# Patient Record
Sex: FEMALE | Race: WHITE | Employment: OTHER | URBAN - METROPOLITAN AREA
[De-identification: names, ages, dates, MRNs, and addresses within clinical notes are randomized per-mention and may not be internally consistent; named-entity substitution may affect disease eponyms.]

---

## 2017-05-03 ENCOUNTER — HOSPITAL ENCOUNTER (OUTPATIENT)
Dept: WOMENS IMAGING | Age: 62
Discharge: HOME | End: 2017-05-03
Attending: FAMILY MEDICINE | Admitting: PHYSICIAN ASSISTANT

## 2017-05-03 DIAGNOSIS — Z12.31 SCREENING MAMMOGRAM, ENCOUNTER FOR: ICD-10-CM

## 2017-05-03 DIAGNOSIS — M85.80 OSTEOPENIA: ICD-10-CM

## 2017-12-04 NOTE — ANESTHESIA PRE-OP
medications for this encounter. Allergies: Allergies   Allergen Reactions    Penicillins        Problem List:    Patient Active Problem List   Diagnosis Code    Incisional hernia K43.2    Fat necrosis M79.89       Past Medical History:        Diagnosis Date    Arthritis     Cancer ([facility] [address] 2016    endometrial    Hypertension        Past Surgical History:        Procedure Laterality Date     SECTION      COLONOSCOPY      exams x2 - HX: polyps, unsure of dates    HERNIA REPAIR  1110    umbillical    HERNIA REPAIR  2323    umbillical    HYSTERECTOMY  10/03/2016    d/t endometrial CA    TUNNELED VENOUS PORT PLACEMENT Right 10/2016       Social History:    Social History   Substance Use Topics    Smoking status: Never Smoker    Smokeless tobacco: Never Used    Alcohol use No                                Counseling given: Not Answered      Vital Signs (Current): There were no vitals filed for this visit. BP Readings from Last 3 Encounters:   14 130/80   10/15/14 130/72   10/06/14 122/78       NPO Status:                                                                                 BMI:   Wt Readings from Last 3 Encounters:   17 225 lb (102.1 kg)   14 240 lb (108.9 kg)   10/15/14 241 lb (109.3 kg)     There is no height or weight on file to calculate BMI. Anesthesia Evaluation   Patient summary reviewed no history of anesthetic complications:   Airway:         Dental:          Pulmonary:                              Cardiovascular:    (+)  hypertension:,                   Neuro/Psych:               GI/Hepatic/Renal:             Endo/Other:    (+)  malignancy/cancer. Abdominal:           Vascular:                                        Anesthesia Plan      general, MAC and TIVA     ASA 3      (Chart review only.)  Induction:  intravenous.                           Vipin Fay CRNA 12/4/2017    =================  Addendum =================  Patient records were reviewed and the patient seen and evaluated. Most Recent Results:  BP (!) 138/46   Pulse 88   Temp 97 °F (36.1 °C) (Temporal)   Resp 18   Ht 5' 2\" (1.575 m)   Wt 224 lb (101.6 kg)   SpO2 99%   BMI 40.97 kg/m²      Lab Results   Component Value Date/Time     10/15/2010 08:46 AM    K 4.0 10/15/2010 08:46 AM     10/15/2010 08:46 AM    CO2 29 10/15/2010 08:46 AM    BUN 12 10/15/2010 08:46 AM    CREATININE 0.6 10/15/2010 08:46 AM     Anesthesiology focused physical examination:  MP2, irregular  RRR without murmur  Lungs clear    Assessment/Plan:  NPO status confirmed  beta-blockers - took her Ziac @ 0510  Plan MAC/TIVA    PS 3    Anesthesia procedures discussed - all questions answered.     Jeremiah Sheikh MD

## 2017-12-05 ENCOUNTER — HOSPITAL ENCOUNTER (OUTPATIENT)
Dept: SURGERY | Age: 62
Discharge: HOME | End: 2017-12-05
Attending: INTERNAL MEDICINE | Admitting: INTERNAL MEDICINE

## 2017-12-05 VITALS
HEIGHT: 62 IN | HEART RATE: 71 BPM | BODY MASS INDEX: 41.22 KG/M2 | WEIGHT: 224 LBS | DIASTOLIC BLOOD PRESSURE: 57 MMHG | RESPIRATION RATE: 16 BRPM | TEMPERATURE: 97.5 F | SYSTOLIC BLOOD PRESSURE: 133 MMHG | OXYGEN SATURATION: 98 %

## 2017-12-05 RX ORDER — SODIUM CHLORIDE, SODIUM LACTATE, POTASSIUM CHLORIDE, CALCIUM CHLORIDE 600; 310; 30; 20 MG/100ML; MG/100ML; MG/100ML; MG/100ML
INJECTION, SOLUTION INTRAVENOUS CONTINUOUS
Status: DISCONTINUED | OUTPATIENT
Start: 2017-12-05 | End: 2017-12-06 | Stop reason: HOSPADM

## 2017-12-05 RX ORDER — MULTIVITAMIN WITH IRON
100 TABLET ORAL DAILY
COMMUNITY

## 2017-12-05 RX ADMIN — SODIUM CHLORIDE, SODIUM LACTATE, POTASSIUM CHLORIDE, CALCIUM CHLORIDE: 600; 310; 30; 20 INJECTION, SOLUTION INTRAVENOUS at 08:05

## 2017-12-05 ASSESSMENT — PAIN DESCRIPTION - ORIENTATION: ORIENTATION: LOWER

## 2017-12-05 ASSESSMENT — PAIN DESCRIPTION - LOCATION: LOCATION: ABDOMEN

## 2017-12-05 ASSESSMENT — PAIN DESCRIPTION - DESCRIPTORS: DESCRIPTORS: CRAMPING

## 2017-12-05 ASSESSMENT — PAIN - FUNCTIONAL ASSESSMENT: PAIN_FUNCTIONAL_ASSESSMENT: 0-10

## 2017-12-05 ASSESSMENT — PAIN SCALES - GENERAL
PAINLEVEL_OUTOF10: 2
PAINLEVEL_OUTOF10: 0

## 2017-12-05 ASSESSMENT — PAIN DESCRIPTION - PAIN TYPE: TYPE: ACUTE PAIN

## 2017-12-05 NOTE — ANESTHESIA POST-OP
Anesthesia Post-op Note    Patient:    Nicko Garcia  MRN:     [id number]   YOB: 1955    Anesthesia Post Evaluation    Final anesthesia type:  MAC-TIVA  Location of evaluation:  OR  Patient participation:   complete - patient participated  Level of consciousness:  awake  Pain score:    0  Airway patency:   patent  Nausea & Vomiting:   no nausea and no vomiting  Complications:  no  Cardiovascular status:  blood pressure returned to baseline  Respiratory status:   acceptable  Hydration status:   euvolemic    Ash Gorman MD

## 2018-08-06 ENCOUNTER — HOSPITAL ENCOUNTER (OUTPATIENT)
Dept: WOMENS IMAGING | Age: 63
Discharge: HOME | End: 2018-08-06
Attending: OBSTETRICS & GYNECOLOGY | Admitting: OBSTETRICS & GYNECOLOGY

## 2018-08-06 DIAGNOSIS — Z12.31 VISIT FOR SCREENING MAMMOGRAM: ICD-10-CM

## 2018-09-14 LAB
ALBUMIN SERPL-MCNC: NORMAL G/DL
ALBUMIN SERPL-MCNC: NORMAL G/DL
ALP BLD-CCNC: NORMAL U/L
ALP BLD-CCNC: NORMAL U/L
ALT SERPL-CCNC: NORMAL U/L
ALT SERPL-CCNC: NORMAL U/L
ANION GAP SERPL CALCULATED.3IONS-SCNC: NORMAL MMOL/L
ANION GAP SERPL CALCULATED.3IONS-SCNC: NORMAL MMOL/L
AST SERPL-CCNC: NORMAL U/L
AST SERPL-CCNC: NORMAL U/L
BILIRUB SERPL-MCNC: NORMAL MG/DL (ref 0.1–1.4)
BILIRUB SERPL-MCNC: NORMAL MG/DL (ref 0.1–1.4)
BUN BLDV-MCNC: NORMAL MG/DL
BUN BLDV-MCNC: NORMAL MG/DL
CALCIUM SERPL-MCNC: NORMAL MG/DL
CALCIUM SERPL-MCNC: NORMAL MG/DL
CHLORIDE BLD-SCNC: NORMAL MMOL/L
CHLORIDE BLD-SCNC: NORMAL MMOL/L
CHOLESTEROL, TOTAL: 197 MG/DL
CHOLESTEROL/HDL RATIO: ABNORMAL
CO2: NORMAL MMOL/L
CO2: NORMAL MMOL/L
CREAT SERPL-MCNC: 0.6 MG/DL
CREAT SERPL-MCNC: 0.6 MG/DL
GFR CALCULATED: NORMAL
GFR CALCULATED: NORMAL
GLUCOSE BLD-MCNC: NORMAL MG/DL
GLUCOSE BLD-MCNC: NORMAL MG/DL
HDLC SERPL-MCNC: 40 MG/DL (ref 35–70)
LDL CHOLESTEROL CALCULATED: 108 MG/DL (ref 0–160)
POTASSIUM SERPL-SCNC: 3.9 MMOL/L
POTASSIUM SERPL-SCNC: 3.9 MMOL/L
SODIUM BLD-SCNC: NORMAL MMOL/L
SODIUM BLD-SCNC: NORMAL MMOL/L
TOTAL PROTEIN: NORMAL
TOTAL PROTEIN: NORMAL
TRIGL SERPL-MCNC: 247 MG/DL
VLDLC SERPL CALC-MCNC: 49 MG/DL

## 2019-06-28 DIAGNOSIS — I10 ESSENTIAL HYPERTENSION: ICD-10-CM

## 2019-06-28 DIAGNOSIS — K76.0 STEATOSIS OF LIVER: ICD-10-CM

## 2019-06-28 DIAGNOSIS — E78.5 HYPERLIPIDEMIA, UNSPECIFIED HYPERLIPIDEMIA TYPE: ICD-10-CM

## 2019-06-28 DIAGNOSIS — Z85.42 HISTORY OF MALIGNANT NEOPLASM OF UTERINE BODY: ICD-10-CM

## 2019-06-28 PROBLEM — K21.9 GERD (GASTROESOPHAGEAL REFLUX DISEASE): Status: ACTIVE | Noted: 2019-06-28

## 2019-06-28 PROBLEM — M17.9 OSTEOARTHRITIS OF KNEE: Status: ACTIVE | Noted: 2019-06-28

## 2019-06-28 RX ORDER — ACETAMINOPHEN 160 MG
TABLET,DISINTEGRATING ORAL DAILY
COMMUNITY

## 2019-06-28 RX ORDER — CLOBETASOL PROPIONATE 0.5 MG/G
OINTMENT TOPICAL 2 TIMES DAILY PRN
COMMUNITY
Start: 2016-11-15 | End: 2021-09-17

## 2019-06-28 RX ORDER — LIDOCAINE AND PRILOCAINE 25; 25 MG/G; MG/G
CREAM TOPICAL
COMMUNITY
Start: 2017-10-12 | End: 2020-01-16

## 2019-06-28 RX ORDER — TRIAMCINOLONE ACETONIDE 5 MG/G
CREAM TOPICAL 2 TIMES DAILY
COMMUNITY
End: 2020-01-16 | Stop reason: SDUPTHER

## 2019-07-11 ENCOUNTER — OFFICE VISIT (OUTPATIENT)
Dept: FAMILY MEDICINE CLINIC | Age: 64
End: 2019-07-11
Payer: COMMERCIAL

## 2019-07-11 VITALS
HEART RATE: 64 BPM | HEIGHT: 58 IN | BODY MASS INDEX: 47.23 KG/M2 | DIASTOLIC BLOOD PRESSURE: 74 MMHG | SYSTOLIC BLOOD PRESSURE: 110 MMHG | WEIGHT: 225 LBS

## 2019-07-11 DIAGNOSIS — M17.9 OSTEOARTHRITIS OF KNEE, UNSPECIFIED LATERALITY, UNSPECIFIED OSTEOARTHRITIS TYPE: ICD-10-CM

## 2019-07-11 DIAGNOSIS — I10 ESSENTIAL HYPERTENSION: Primary | ICD-10-CM

## 2019-07-11 DIAGNOSIS — C54.1 ENDOMETRIAL CANCER (HCC): ICD-10-CM

## 2019-07-11 DIAGNOSIS — R73.9 HYPERGLYCEMIA: ICD-10-CM

## 2019-07-11 DIAGNOSIS — K21.9 GASTROESOPHAGEAL REFLUX DISEASE WITHOUT ESOPHAGITIS: ICD-10-CM

## 2019-07-11 PROCEDURE — 99214 OFFICE O/P EST MOD 30 MIN: CPT | Performed by: FAMILY MEDICINE

## 2019-07-11 RX ORDER — FELODIPINE 2.5 MG/1
2.5 TABLET, EXTENDED RELEASE ORAL DAILY
Qty: 30 TABLET | Refills: 5 | Status: SHIPPED | OUTPATIENT
Start: 2019-07-11 | End: 2020-01-16 | Stop reason: SDUPTHER

## 2019-07-11 RX ORDER — FAMOTIDINE 20 MG/1
20 TABLET, FILM COATED ORAL 2 TIMES DAILY
Qty: 60 TABLET | Refills: 5 | Status: SHIPPED | OUTPATIENT
Start: 2019-07-11 | End: 2020-02-17 | Stop reason: SDUPTHER

## 2019-07-11 RX ORDER — FELODIPINE 2.5 MG/1
10 TABLET, EXTENDED RELEASE ORAL DAILY
Qty: 30 TABLET | Refills: 5 | Status: CANCELLED | OUTPATIENT
Start: 2019-07-11 | End: 2019-08-10

## 2019-07-11 RX ORDER — BISOPROLOL FUMARATE AND HYDROCHLOROTHIAZIDE 10; 6.25 MG/1; MG/1
1 TABLET ORAL DAILY
Qty: 30 TABLET | Refills: 5 | Status: SHIPPED | OUTPATIENT
Start: 2019-07-11 | End: 2020-01-16 | Stop reason: SDUPTHER

## 2019-07-11 ASSESSMENT — ENCOUNTER SYMPTOMS
CHEST TIGHTNESS: 0
RESPIRATORY NEGATIVE: 1
ABDOMINAL PAIN: 0
SINUS PRESSURE: 0
SORE THROAT: 0
SHORTNESS OF BREATH: 0
COUGH: 0
RHINORRHEA: 0
EYES NEGATIVE: 1
CONSTIPATION: 0
DIARRHEA: 0
ALLERGIC/IMMUNOLOGIC NEGATIVE: 1

## 2019-07-11 ASSESSMENT — PATIENT HEALTH QUESTIONNAIRE - PHQ9
1. LITTLE INTEREST OR PLEASURE IN DOING THINGS: 0
SUM OF ALL RESPONSES TO PHQ9 QUESTIONS 1 & 2: 0
SUM OF ALL RESPONSES TO PHQ QUESTIONS 1-9: 0
2. FEELING DOWN, DEPRESSED OR HOPELESS: 0
SUM OF ALL RESPONSES TO PHQ QUESTIONS 1-9: 0

## 2019-08-27 ENCOUNTER — HOSPITAL ENCOUNTER (OUTPATIENT)
Dept: WOMENS IMAGING | Age: 64
Discharge: HOME | End: 2019-08-27
Payer: COMMERCIAL

## 2019-08-27 DIAGNOSIS — Z12.31 SCREENING MAMMOGRAM, ENCOUNTER FOR: ICD-10-CM

## 2019-08-27 PROCEDURE — 77067 SCR MAMMO BI INCL CAD: CPT

## 2020-01-16 ENCOUNTER — OFFICE VISIT (OUTPATIENT)
Dept: FAMILY MEDICINE CLINIC | Age: 65
End: 2020-01-16
Payer: COMMERCIAL

## 2020-01-16 VITALS
HEART RATE: 64 BPM | BODY MASS INDEX: 33.65 KG/M2 | HEIGHT: 68 IN | DIASTOLIC BLOOD PRESSURE: 82 MMHG | WEIGHT: 222 LBS | SYSTOLIC BLOOD PRESSURE: 122 MMHG

## 2020-01-16 DIAGNOSIS — E78.2 MIXED HYPERLIPIDEMIA: ICD-10-CM

## 2020-01-16 DIAGNOSIS — I10 ESSENTIAL HYPERTENSION: ICD-10-CM

## 2020-01-16 DIAGNOSIS — R73.9 HYPERGLYCEMIA: ICD-10-CM

## 2020-01-16 LAB
A/G RATIO: 1.8 (ref 1.1–2.2)
ALBUMIN SERPL-MCNC: 4.6 G/DL (ref 3.4–5)
ALP BLD-CCNC: 62 U/L (ref 40–129)
ALT SERPL-CCNC: 39 U/L (ref 10–40)
ANION GAP SERPL CALCULATED.3IONS-SCNC: 17 MMOL/L (ref 3–16)
AST SERPL-CCNC: 32 U/L (ref 15–37)
BASOPHILS ABSOLUTE: 0.1 K/UL (ref 0–0.2)
BASOPHILS RELATIVE PERCENT: 0.8 %
BILIRUB SERPL-MCNC: 0.4 MG/DL (ref 0–1)
BUN BLDV-MCNC: 14 MG/DL (ref 7–20)
CALCIUM SERPL-MCNC: 9.7 MG/DL (ref 8.3–10.6)
CHLORIDE BLD-SCNC: 100 MMOL/L (ref 99–110)
CHOLESTEROL, TOTAL: 177 MG/DL (ref 0–199)
CO2: 25 MMOL/L (ref 21–32)
CREAT SERPL-MCNC: 0.6 MG/DL (ref 0.6–1.2)
EOSINOPHILS ABSOLUTE: 0.1 K/UL (ref 0–0.6)
EOSINOPHILS RELATIVE PERCENT: 2.2 %
GFR AFRICAN AMERICAN: >60
GFR NON-AFRICAN AMERICAN: >60
GLOBULIN: 2.5 G/DL
GLUCOSE BLD-MCNC: 102 MG/DL (ref 70–99)
HCT VFR BLD CALC: 40.4 % (ref 36–48)
HDLC SERPL-MCNC: 42 MG/DL (ref 40–60)
HEMOGLOBIN: 14 G/DL (ref 12–16)
LDL CHOLESTEROL CALCULATED: 81 MG/DL
LYMPHOCYTES ABSOLUTE: 0.9 K/UL (ref 1–5.1)
LYMPHOCYTES RELATIVE PERCENT: 14.9 %
MCH RBC QN AUTO: 31.5 PG (ref 26–34)
MCHC RBC AUTO-ENTMCNC: 34.6 G/DL (ref 31–36)
MCV RBC AUTO: 91.1 FL (ref 80–100)
MONOCYTES ABSOLUTE: 0.6 K/UL (ref 0–1.3)
MONOCYTES RELATIVE PERCENT: 9.2 %
NEUTROPHILS ABSOLUTE: 4.4 K/UL (ref 1.7–7.7)
NEUTROPHILS RELATIVE PERCENT: 72.9 %
PDW BLD-RTO: 15.2 % (ref 12.4–15.4)
PLATELET # BLD: 204 K/UL (ref 135–450)
PMV BLD AUTO: 8.1 FL (ref 5–10.5)
POTASSIUM SERPL-SCNC: 3.8 MMOL/L (ref 3.5–5.1)
RBC # BLD: 4.43 M/UL (ref 4–5.2)
SODIUM BLD-SCNC: 142 MMOL/L (ref 136–145)
TOTAL PROTEIN: 7.1 G/DL (ref 6.4–8.2)
TRIGL SERPL-MCNC: 272 MG/DL (ref 0–150)
VLDLC SERPL CALC-MCNC: 54 MG/DL
WBC # BLD: 6 K/UL (ref 4–11)

## 2020-01-16 PROCEDURE — 99214 OFFICE O/P EST MOD 30 MIN: CPT | Performed by: FAMILY MEDICINE

## 2020-01-16 PROCEDURE — 90686 IIV4 VACC NO PRSV 0.5 ML IM: CPT | Performed by: FAMILY MEDICINE

## 2020-01-16 PROCEDURE — 90471 IMMUNIZATION ADMIN: CPT | Performed by: FAMILY MEDICINE

## 2020-01-16 RX ORDER — FELODIPINE 2.5 MG/1
2.5 TABLET, EXTENDED RELEASE ORAL DAILY
Qty: 30 TABLET | Refills: 5 | Status: SHIPPED | OUTPATIENT
Start: 2020-01-16 | End: 2020-08-05 | Stop reason: SDUPTHER

## 2020-01-16 RX ORDER — BISOPROLOL FUMARATE AND HYDROCHLOROTHIAZIDE 10; 6.25 MG/1; MG/1
1 TABLET ORAL DAILY
Qty: 30 TABLET | Refills: 5 | Status: SHIPPED | OUTPATIENT
Start: 2020-01-16 | End: 2020-08-05 | Stop reason: SDUPTHER

## 2020-01-16 RX ORDER — TRIAMCINOLONE ACETONIDE 5 MG/G
CREAM TOPICAL 2 TIMES DAILY
Qty: 30 G | Refills: 5 | Status: SHIPPED | OUTPATIENT
Start: 2020-01-16 | End: 2020-02-15

## 2020-01-16 ASSESSMENT — ENCOUNTER SYMPTOMS
CONSTIPATION: 0
RHINORRHEA: 0
SINUS PRESSURE: 0
DIARRHEA: 0
SORE THROAT: 0
ABDOMINAL PAIN: 0
CHEST TIGHTNESS: 0
SHORTNESS OF BREATH: 0
COUGH: 0

## 2020-01-16 ASSESSMENT — PATIENT HEALTH QUESTIONNAIRE - PHQ9
SUM OF ALL RESPONSES TO PHQ QUESTIONS 1-9: 0
1. LITTLE INTEREST OR PLEASURE IN DOING THINGS: 0
SUM OF ALL RESPONSES TO PHQ9 QUESTIONS 1 & 2: 0
2. FEELING DOWN, DEPRESSED OR HOPELESS: 0
SUM OF ALL RESPONSES TO PHQ QUESTIONS 1-9: 0

## 2020-01-17 LAB
ESTIMATED AVERAGE GLUCOSE: 91.1 MG/DL
HBA1C MFR BLD: 4.8 %

## 2020-01-17 NOTE — PROGRESS NOTES
1/16/2020    Radha Matthews    Chief Complaint   Patient presents with    6 Month Follow-Up    Other     no c/o       HPI  Haider Pritchett is a 59 y.o. female who presents today for follow up:    Patient is in excellent spirits. She seems to be doing very well status post her treatment for endometrial cancer. She continues to follow-up with them. She denies pain. Patient feels her reflux is controlled. She was to remain on the same medication. There is no side effects of her medication. Patient denies orthostatic symptoms. There is no side effects to her blood pressure medication. She wishes to remain on the same. Patient had no labs to review in 2019 from us. Patient is willing to do labs when fasting. REVIEW OF SYMPTOMS  Review of Systems   Constitutional: Negative for chills and fever. HENT: Negative for rhinorrhea, sinus pressure and sore throat. Respiratory: Negative for cough, chest tightness and shortness of breath. Gastrointestinal: Negative for abdominal pain, constipation and diarrhea. Genitourinary: Negative for dysuria and frequency. Musculoskeletal: Negative for myalgias. PAST MEDICAL HISTORY  Past Medical History:   Diagnosis Date    Essential hypertension 6/28/2019    GERD (gastroesophageal reflux disease) 6/28/2019    History of malignant neoplasm of uterine body 6/28/2019    Hyperlipidemia 6/28/2019    Osteoarthritis of knee 6/28/2019    Steatosis of liver 6/28/2019       FAMILY HISTORY  No family history on file.     SOCIAL HISTORY  Social History     Socioeconomic History    Marital status:      Spouse name: Not on file    Number of children: Not on file    Years of education: Not on file    Highest education level: Not on file   Occupational History    Not on file   Social Needs    Financial resource strain: Not on file    Food insecurity:     Worry: Not on file     Inability: Not on file    Transportation needs:     Medical: Not on file Non-medical: Not on file   Tobacco Use    Smoking status: Never Smoker    Smokeless tobacco: Never Used   Substance and Sexual Activity    Alcohol use: No    Drug use: No    Sexual activity: Not on file   Lifestyle    Physical activity:     Days per week: Not on file     Minutes per session: Not on file    Stress: Not on file   Relationships    Social connections:     Talks on phone: Not on file     Gets together: Not on file     Attends Restorationism service: Not on file     Active member of club or organization: Not on file     Attends meetings of clubs or organizations: Not on file     Relationship status: Not on file    Intimate partner violence:     Fear of current or ex partner: Not on file     Emotionally abused: Not on file     Physically abused: Not on file     Forced sexual activity: Not on file   Other Topics Concern    Not on file   Social History Narrative    Not on file        SURGICAL HISTORY  Past Surgical History:   Procedure Laterality Date     SECTION      COLONOSCOPY      exams x2 - HX: polyps, unsure of dates (endometrial CA)    COLONOSCOPY  2017    Normal exam    HERNIA REPAIR  3090    umbillical    HERNIA REPAIR  4046    umbillical    HYSTERECTOMY  10/03/2016    d/t endometrial CA    TUNNELED VENOUS PORT PLACEMENT Right 10/2016       CURRENT MEDICATIONS  Current Outpatient Medications   Medication Sig Dispense Refill    bisoprolol-hydrochlorothiazide (ZIAC) 10-6.25 MG per tablet Take 1 tablet by mouth daily 30 tablet 5    felodipine (PLENDIL) 2.5 MG extended release tablet Take 1 tablet by mouth daily 30 tablet 5    triamcinolone (ARISTOCORT) 0.5 % cream Apply topically 2 times daily Indications: apply to neck lesions twice daily Apply topically 3 times daily.  30 g 5    famotidine (PEPCID) 20 MG tablet Take 1 tablet by mouth 2 times daily 60 tablet 5    clobetasol (TEMOVATE) 0.05 % ointment Apply topically 2 times daily as needed Apply to rash under breast and on feet twice daily prn      Cholecalciferol (VITAMIN D3) 2000 units CAPS Take by mouth daily      vitamin B-6 (PYRIDOXINE) 100 MG tablet Take 100 mg by mouth daily      multivitamin-iron-minerals-folic acid (CENTRUM) chewable tablet Take 1 tablet by mouth daily. No current facility-administered medications for this visit. ALLERGIES  Allergies   Allergen Reactions    Erythromycin Other (See Comments)     GI    Lipitor [Atorvastatin Calcium]     Penicillins     Pravachol [Pravastatin Sodium]     Zocor [Simvastatin]     Doxycycline Rash       PHYSICAL EXAM  /82   Pulse 64   Ht 5' 8\" (1.727 m)   Wt 222 lb (100.7 kg)   BMI 33.75 kg/m²     Physical Exam  Constitutional:       Appearance: She is well-developed. HENT:      Head: Normocephalic. Eyes:      Conjunctiva/sclera: Conjunctivae normal.   Neck:      Musculoskeletal: Neck supple. Cardiovascular:      Rate and Rhythm: Normal rate and regular rhythm. Heart sounds: Normal heart sounds. Pulmonary:      Effort: Pulmonary effort is normal.      Breath sounds: Normal breath sounds. Musculoskeletal: Normal range of motion. Skin:     General: Skin is warm and dry. Neurological:      Mental Status: She is alert and oriented to person, place, and time. Psychiatric:         Thought Content: Thought content normal.                  ASSESSMENT & PLAN  1. Essential hypertension  Issue controlled. Continue meds. Refilled meds. - bisoprolol-hydrochlorothiazide (ZIAC) 10-6.25 MG per tablet; Take 1 tablet by mouth daily  Dispense: 30 tablet; Refill: 5  - felodipine (PLENDIL) 2.5 MG extended release tablet; Take 1 tablet by mouth daily  Dispense: 30 tablet; Refill: 5  - Comprehensive Metabolic Panel; Future  - CBC Auto Differential; Future    2. Gastroesophageal reflux disease without esophagitis  Issue controlled. Continue meds. Refilled meds.     3. Endometrial cancer [facility])  Doing excellent continue to follow-up with oncologist    4. Mixed hyperlipidemia  Issue is stable check labs today. Adjust medication off of lab results. - Lipid Panel; Future    5. Hyperglycemia  Rule out interval onset diabetes  - Hemoglobin A1C; Future    6.  Needs flu shot  - Influenza, Quadv, 3 yrs and older, IM, PF, Prefill Syr or SDV, 0.5mL (AFLURIA QUADV, PF)               Follow-up 6 months    Electronically signed by Esequiel Allen MD on 1/16/2020

## 2020-02-17 RX ORDER — FAMOTIDINE 20 MG/1
20 TABLET, FILM COATED ORAL 2 TIMES DAILY
Qty: 60 TABLET | Refills: 5 | Status: SHIPPED | OUTPATIENT
Start: 2020-02-17 | End: 2020-08-05 | Stop reason: SDUPTHER

## 2020-03-24 ENCOUNTER — TELEPHONE (OUTPATIENT)
Dept: FAMILY MEDICINE CLINIC | Age: 65
End: 2020-03-24

## 2020-03-24 NOTE — TELEPHONE ENCOUNTER
She should not be taking Robitussin-DM and Coricidin both as they both have similar medications in them. She can do a saline nasal rinse or Flonase over-the-counter which would help with the drainage. No other treatment recommended.

## 2020-03-24 NOTE — TELEPHONE ENCOUNTER
Runny nose, draining down her throat. Taking robitussin dm and coricidin hbp    Would you suggest any other treatment? ?    Ts done

## 2020-04-07 ENCOUNTER — TELEPHONE (OUTPATIENT)
Dept: FAMILY MEDICINE CLINIC | Age: 65
End: 2020-04-07

## 2020-04-08 NOTE — TELEPHONE ENCOUNTER
Patient is on felodipine (Plendil) and bisoprolol-HCTZ (Ziac) for HTN. There are no interactions with loratadine for either of these meds, so yes, it is okay for her to take Claritin.

## 2020-08-05 ENCOUNTER — VIRTUAL VISIT (OUTPATIENT)
Dept: FAMILY MEDICINE CLINIC | Age: 65
End: 2020-08-05
Payer: MEDICARE

## 2020-08-05 PROCEDURE — 99442 PR PHYS/QHP TELEPHONE EVALUATION 11-20 MIN: CPT | Performed by: FAMILY MEDICINE

## 2020-08-05 RX ORDER — LORATADINE 10 MG/1
10 TABLET ORAL DAILY
Qty: 30 TABLET | Refills: 5 | Status: SHIPPED | OUTPATIENT
Start: 2020-08-05 | End: 2021-09-17 | Stop reason: SDUPTHER

## 2020-08-05 RX ORDER — FELODIPINE 2.5 MG/1
2.5 TABLET, EXTENDED RELEASE ORAL DAILY
Qty: 30 TABLET | Refills: 5 | Status: SHIPPED | OUTPATIENT
Start: 2020-08-05 | End: 2021-02-01 | Stop reason: SDUPTHER

## 2020-08-05 RX ORDER — FAMOTIDINE 20 MG/1
20 TABLET, FILM COATED ORAL 2 TIMES DAILY
Qty: 60 TABLET | Refills: 5 | Status: SHIPPED | OUTPATIENT
Start: 2020-08-05 | End: 2021-02-02

## 2020-08-05 RX ORDER — BISOPROLOL FUMARATE AND HYDROCHLOROTHIAZIDE 10; 6.25 MG/1; MG/1
1 TABLET ORAL DAILY
Qty: 30 TABLET | Refills: 5 | Status: SHIPPED | OUTPATIENT
Start: 2020-08-05 | End: 2021-02-04 | Stop reason: SDUPTHER

## 2020-08-05 NOTE — PROGRESS NOTES
Arnold Ware is a 72 y.o. female evaluated via telephone on 8/5/2020. Consent:  She and/or health care decision maker is aware that that she may receive a bill for this telephone service, depending on her insurance coverage, and has provided verbal consent to proceed: Yes      Documentation:  I communicated with the patient and/or health care decision maker about hypertension, history of uterine cancer and allergic rhinitis. Details of this discussion including any medical advice provided:     Denies orthostasis. Pt without side effects of his bp meds. Notes compliance with bp meds. Patient notes that her home blood pressures run in the 130s over 60-70. She is currently down 14 pounds since her last visit. She is trying to lose weight. Patient continues to follow-up with her GYN oncologist.  She is not gotten any bad news. Patient appears to be in remission. Patient notes significant allergic symptoms they were not controlled with loratadine alone. Patient is interested in trying Zyrtec and I suggested saline nasal spray in addition twice daily. Patient noted her medications for GERD controlling her. We reviewed her last labs were excellent except for elevated triglycerides but a great cholesterol. Patient follow-up in 6 months          I affirm this is a Patient Initiated Episode with a Patient who has not had a related appointment within my department in the past 7 days or scheduled within the next 24 hours.     Patient identification was verified at the start of the visit: Yes    Total Time: minutes: 11-20 minutes    Note: not billable if this call serves to triage the patient into an appointment for the relevant concern      Ely Waggoner

## 2020-08-28 ENCOUNTER — TELEPHONE (OUTPATIENT)
Dept: FAMILY MEDICINE CLINIC | Age: 65
End: 2020-08-28

## 2020-08-28 NOTE — TELEPHONE ENCOUNTER
Please let her know that Dr. Jillian Cabral is out of the office today, but will be back on Monday I can better address this as I am not familiar with her medical history. When when she left the visiting her sister? No longer has her sister been recovered from [place]? Is her sister still having any symptoms?

## 2020-08-28 NOTE — TELEPHONE ENCOUNTER
Pt would like to see if it is safe to go visit her sister in [place]-- her sister has perviously recovered from covid and the pt wants to know if it is safe for her to go out and visit considering her health issues that she has-- please advise and call pt back to let her know

## 2020-08-31 NOTE — TELEPHONE ENCOUNTER
Spoke with pt, pt states pt never tested for covid. Did have a cough, loss of sense taste & smell, this occurred 7/2020. Sister's son 7/2020 was positive for covid. Son/nephew now sx's free. Pt is a cancer pt who is currently in remission? if not safe at this time what about 3 to 4 months from now?

## 2020-09-01 NOTE — TELEPHONE ENCOUNTER
I believe the question is, should my sister who had COVID in July be safe for me to see now in September. If your sister has poor health or a poor immune system or a very difficult case of COVID.,  She would be considered clear of the virus 3 weeks after the onset of symptoms if patient is no longer having symptoms or fever. Please be cautious of other family members who may have picked this up from her and who may have the less symptoms like younger relatives.

## 2020-11-05 ENCOUNTER — TELEPHONE (OUTPATIENT)
Dept: FAMILY MEDICINE CLINIC | Age: 65
End: 2020-11-05

## 2020-11-05 NOTE — TELEPHONE ENCOUNTER
Pt would like to see family the day before thanksgiving at a restaurant-- there will be 4 people she has not been around-- a 9year old and a 3year old are two of them     They want to know if it is okay to be social distanced and have mask on besides when eating    Is this safe for the pt to do?    Please call pt back

## 2020-11-06 NOTE — TELEPHONE ENCOUNTER
Venus Flood because of your weakened immune system, we need your wrist to be as low as possible. The best way to be with family would be 6 feet apart with masks outdoors. Being indoors, being closer to 6 than 6 feet, having her mask off to eat are all higher levels of risk. Please choose appropriately as you wish.

## 2021-02-01 DIAGNOSIS — I10 ESSENTIAL HYPERTENSION: ICD-10-CM

## 2021-02-01 RX ORDER — FELODIPINE 2.5 MG/1
2.5 TABLET, EXTENDED RELEASE ORAL DAILY
Qty: 30 TABLET | Refills: 5 | Status: SHIPPED | OUTPATIENT
Start: 2021-02-01 | End: 2021-05-27

## 2021-02-02 DIAGNOSIS — K21.9 GASTROESOPHAGEAL REFLUX DISEASE WITHOUT ESOPHAGITIS: ICD-10-CM

## 2021-02-02 RX ORDER — FAMOTIDINE 20 MG/1
TABLET, FILM COATED ORAL
Qty: 60 TABLET | Refills: 0 | Status: SHIPPED | OUTPATIENT
Start: 2021-02-02 | End: 2021-02-04 | Stop reason: SDUPTHER

## 2021-02-02 NOTE — TELEPHONE ENCOUNTER
Requested Prescriptions     Signed Prescriptions Disp Refills    famotidine (PEPCID) 20 MG tablet 60 tablet 0     Sig: TAKE 1 TABLET BY MOUTH TWICE PER DAY     Authorizing Provider: Luis Loyola     Ordering User: Roseanna Webber

## 2021-02-04 ENCOUNTER — VIRTUAL VISIT (OUTPATIENT)
Dept: FAMILY MEDICINE CLINIC | Age: 66
End: 2021-02-04
Payer: MEDICARE

## 2021-02-04 DIAGNOSIS — K21.9 GASTROESOPHAGEAL REFLUX DISEASE WITHOUT ESOPHAGITIS: ICD-10-CM

## 2021-02-04 DIAGNOSIS — R73.9 HYPERGLYCEMIA: ICD-10-CM

## 2021-02-04 DIAGNOSIS — F51.01 PRIMARY INSOMNIA: Primary | ICD-10-CM

## 2021-02-04 DIAGNOSIS — I10 ESSENTIAL HYPERTENSION: ICD-10-CM

## 2021-02-04 DIAGNOSIS — C54.1 ENDOMETRIAL CANCER (HCC): ICD-10-CM

## 2021-02-04 PROCEDURE — 99214 OFFICE O/P EST MOD 30 MIN: CPT | Performed by: FAMILY MEDICINE

## 2021-02-04 RX ORDER — FAMOTIDINE 20 MG/1
TABLET, FILM COATED ORAL
Qty: 60 TABLET | Refills: 5 | Status: SHIPPED | OUTPATIENT
Start: 2021-02-04 | End: 2021-08-31

## 2021-02-04 RX ORDER — BISOPROLOL FUMARATE AND HYDROCHLOROTHIAZIDE 10; 6.25 MG/1; MG/1
1 TABLET ORAL DAILY
Qty: 30 TABLET | Refills: 5 | Status: SHIPPED | OUTPATIENT
Start: 2021-02-04 | End: 2021-07-28

## 2021-02-04 RX ORDER — LIDOCAINE AND PRILOCAINE 25; 25 MG/G; MG/G
CREAM TOPICAL
COMMUNITY
Start: 2020-06-05

## 2021-02-04 ASSESSMENT — PATIENT HEALTH QUESTIONNAIRE - PHQ9: SUM OF ALL RESPONSES TO PHQ QUESTIONS 1-9: 0

## 2021-02-05 PROBLEM — F51.01 PRIMARY INSOMNIA: Status: ACTIVE | Noted: 2021-02-05

## 2021-02-05 NOTE — PROGRESS NOTES
2021    TELEHEALTH EVALUATION -- Audio/Visual (During BQZYA-02 public health emergency) via [email or web address] in [address] [place] [address] Dr VARGAS:    Syeda Ayden (:  1955) has requested an audio/video evaluation for the following concern(s):    Patient continues to do well with her metastatic endometrial cancer. She is lost 6 more pounds down to 202 pounds with a BMI of 33.7. She is happy with her weight loss. Patient complains of insomnia. Her  sleeps at all different hours walks in and out of the bedroom. REVIEW OF SYSTEMS    Constitutional:  Denies fever, chills, weight loss or weakness  Eyes:  no photophobia or discharge  ENT:  no sore throat or ear pain  Cardiovascular:  Denies chest pain, palpitations or swelling  Respiratory:  Denies cough or shortness of breath  GI:  no abdominal pain, nausea, vomiting, or diarrhea  Musculoskeletal:  no back pain  Skin:  No rashes  Neurologic:  no headache, focal weakness, or sensory changes  Endocrine:  no polyuria or polydipsia        Prior to Visit Medications    Medication Sig Taking? Authorizing Provider   lidocaine-prilocaine (EMLA) 2.5-2.5 % cream Apply generously to port site 1 hr prior to treatment. Use non absorbing cover.  Indications: Administration of Local Anesthesia Yes Historical Provider, MD   famotidine (PEPCID) 20 MG tablet TAKE 1 TABLET BY MOUTH TWICE PER DAY Yes Amarjit Krishnamurthy MD   bisoprolol-hydroCHLOROthiazide [facility]) 10-6.25 MG per tablet Take 1 tablet by mouth daily Yes Amarjit Krishnamurthy MD   felodipine (PLENDIL) 2.5 MG extended release tablet Take 1 tablet by mouth daily Yes Amarjit Krishnamurthy MD   loratadine (CLARITIN) 10 MG tablet Take 1 tablet by mouth daily Yes Amarjit Krishnamurthy MD   Cholecalciferol (VITAMIN D3) 2000 units CAPS Take by mouth daily Yes Historical Provider, MD   vitamin B-6 (PYRIDOXINE) 100 MG tablet Take 100 mg by mouth daily Yes Historical Provider, MD   multivitamin-iron-minerals-folic acid (CENTRUM) chewable tablet Take 1 tablet by mouth daily. Yes Historical Provider, MD   clobetasol (TEMOVATE) 0.05 % ointment Apply topically 2 times daily as needed Apply to rash under breast and on feet twice daily prn  Historical Provider, MD       Social History     Tobacco Use    Smoking status: Never Smoker    Smokeless tobacco: Never Used   Substance Use Topics    Alcohol use: No    Drug use: No        Past Medical History:   Diagnosis Date    Essential hypertension 2019    GERD (gastroesophageal reflux disease) 2019    History of malignant neoplasm of uterine body 2019    Hyperlipidemia 2019    Osteoarthritis of knee 2019    Steatosis of liver 2019   ,   Past Surgical History:   Procedure Laterality Date     SECTION      COLONOSCOPY      exams x2 - HX: polyps, unsure of dates (endometrial CA)    COLONOSCOPY  2017    Normal exam    HERNIA REPAIR  2575    umbillical    HERNIA REPAIR  8026    umbillical    HYSTERECTOMY  10/03/2016    d/t endometrial CA    TUNNELED VENOUS PORT PLACEMENT Right 10/2016           PHYSICAL EXAM    There were no vitals taken for this visit. ASSESSMENT/PLAN:  1. Primary insomnia  Discussed sleep hygiene. Specifically also recommended melatonin 5 mg daily, starting a walking routine to tire herself and use earplugs. 2. Endometrial cancer ([address]  Doing well. Continue follow-up with GYN oncology    3. Essential hypertension  Issue controlled. Continue meds. Refilled meds. Asked patient to check more home blood pressures as we expect to need to wean her medication down. - bisoprolol-hydroCHLOROthiazide (ZIAC) 10-6.25 MG per tablet; Take 1 tablet by mouth daily  Dispense: 30 tablet; Refill: 5    4. Hyperglycemia  On follow-up rule out interval onset diabetes again    5. Gastroesophageal reflux disease without esophagitis  Issue controlled. Continue meds. Refilled meds.   - famotidine (PEPCID) 20 MG tablet; TAKE 1 TABLET BY MOUTH TWICE PER DAY  Dispense: 60 tablet; Refill: 5      Return in about 6 months (around 8/4/2021). Tu Morris is a 72 y.o. female being evaluated by a Virtual Visit (video visit) encounter to address concerns as mentioned above. A caregiver was present when appropriate. Due to this being a TeleHealth encounter (During THDPR-15 public health emergency), evaluation of the following organ systems was limited: Vitals/Constitutional/EENT/Resp/CV/GI//MS/Neuro/Skin/Heme-Lymph-Imm. Pursuant to the emergency declaration under the [address] [place] [address], [address] [place] [address] authority and the [employer] Act, this Virtual Visit was conducted with patient's (and/or legal guardian's) consent, to reduce the patient's risk of exposure to COVID-19 and provide necessary medical care. The patient (and/or legal guardian) has also been advised to contact this office for worsening conditions or problems, and seek emergency medical treatment and/or call 911 if deemed necessary. Patient identification was verified at the start of the visit: Yes    Total time spent on this encounter: Not billed by time    Services were provided through a video synchronous discussion virtually to substitute for in-person clinic visit. Patient and provider were located at their individual homes. --Roshni Melvin MD on 2/5/2021 at 6:50 PM    An electronic signature was used to authenticate this note.

## 2021-03-03 ENCOUNTER — TELEPHONE (OUTPATIENT)
Dept: FAMILY MEDICINE CLINIC | Age: 66
End: 2021-03-03

## 2021-03-03 NOTE — TELEPHONE ENCOUNTER
BP READINGS    3/3 141/64 NO MEDS  3/2 125/61  3/1 131/53  2/28 135/54  2/27 121/59  2/26 149/59    PLEASE AADVISE

## 2021-03-04 NOTE — TELEPHONE ENCOUNTER
Jumana Harry, your blood pressures look great off of medication. If you are feeling fine, please continue the same.

## 2021-03-04 NOTE — TELEPHONE ENCOUNTER
Spoke with pt per Dr. Carmelita Dunn note. Pt voiced understanding. Pt stated the only reading without her medication was 3/3/21 and she is going to continue to take her BP medications as prescribed.

## 2021-05-27 DIAGNOSIS — I10 ESSENTIAL HYPERTENSION: ICD-10-CM

## 2021-05-27 RX ORDER — FELODIPINE 2.5 MG/1
TABLET, EXTENDED RELEASE ORAL
Qty: 90 TABLET | Refills: 0 | Status: SHIPPED | OUTPATIENT
Start: 2021-05-27 | End: 2021-07-28

## 2021-07-28 DIAGNOSIS — I10 ESSENTIAL HYPERTENSION: ICD-10-CM

## 2021-07-28 RX ORDER — FELODIPINE 2.5 MG/1
TABLET, EXTENDED RELEASE ORAL
Qty: 90 TABLET | Refills: 0 | Status: SHIPPED | OUTPATIENT
Start: 2021-07-28 | End: 2021-09-17 | Stop reason: SDUPTHER

## 2021-07-28 RX ORDER — BISOPROLOL FUMARATE AND HYDROCHLOROTHIAZIDE 10; 6.25 MG/1; MG/1
TABLET ORAL
Qty: 90 TABLET | Refills: 1 | Status: SHIPPED | OUTPATIENT
Start: 2021-07-28 | End: 2021-11-29

## 2021-08-30 DIAGNOSIS — K21.9 GASTROESOPHAGEAL REFLUX DISEASE WITHOUT ESOPHAGITIS: ICD-10-CM

## 2021-08-31 RX ORDER — FAMOTIDINE 20 MG/1
TABLET, FILM COATED ORAL
Qty: 60 TABLET | Refills: 5 | Status: SHIPPED | OUTPATIENT
Start: 2021-08-31 | End: 2021-09-17 | Stop reason: SDUPTHER

## 2021-09-17 ENCOUNTER — TELEMEDICINE (OUTPATIENT)
Dept: FAMILY MEDICINE CLINIC | Age: 66
End: 2021-09-17
Payer: MEDICARE

## 2021-09-17 DIAGNOSIS — R73.9 HYPERGLYCEMIA: ICD-10-CM

## 2021-09-17 DIAGNOSIS — J30.89 NON-SEASONAL ALLERGIC RHINITIS, UNSPECIFIED TRIGGER: ICD-10-CM

## 2021-09-17 DIAGNOSIS — E78.2 MIXED HYPERLIPIDEMIA: ICD-10-CM

## 2021-09-17 DIAGNOSIS — K21.9 GASTROESOPHAGEAL REFLUX DISEASE WITHOUT ESOPHAGITIS: ICD-10-CM

## 2021-09-17 DIAGNOSIS — C54.1 ENDOMETRIAL CANCER (HCC): ICD-10-CM

## 2021-09-17 DIAGNOSIS — I10 ESSENTIAL HYPERTENSION: Primary | ICD-10-CM

## 2021-09-17 PROCEDURE — 99214 OFFICE O/P EST MOD 30 MIN: CPT | Performed by: FAMILY MEDICINE

## 2021-09-17 RX ORDER — FAMOTIDINE 20 MG/1
TABLET, FILM COATED ORAL
Qty: 180 TABLET | Refills: 1 | Status: SHIPPED | OUTPATIENT
Start: 2021-09-17 | End: 2022-02-28 | Stop reason: SDUPTHER

## 2021-09-17 RX ORDER — FELODIPINE 2.5 MG/1
TABLET, EXTENDED RELEASE ORAL
Qty: 90 TABLET | Refills: 1 | Status: SHIPPED | OUTPATIENT
Start: 2021-09-17 | End: 2022-02-28 | Stop reason: SDUPTHER

## 2021-09-17 RX ORDER — LORATADINE 10 MG/1
10 TABLET ORAL DAILY
Qty: 90 TABLET | Refills: 1 | Status: SHIPPED | OUTPATIENT
Start: 2021-09-17 | End: 2021-12-16

## 2021-09-17 SDOH — ECONOMIC STABILITY: FOOD INSECURITY: WITHIN THE PAST 12 MONTHS, THE FOOD YOU BOUGHT JUST DIDN'T LAST AND YOU DIDN'T HAVE MONEY TO GET MORE.: NEVER TRUE

## 2021-09-17 SDOH — ECONOMIC STABILITY: FOOD INSECURITY: WITHIN THE PAST 12 MONTHS, YOU WORRIED THAT YOUR FOOD WOULD RUN OUT BEFORE YOU GOT MONEY TO BUY MORE.: NEVER TRUE

## 2021-09-17 ASSESSMENT — SOCIAL DETERMINANTS OF HEALTH (SDOH): HOW HARD IS IT FOR YOU TO PAY FOR THE VERY BASICS LIKE FOOD, HOUSING, MEDICAL CARE, AND HEATING?: NOT HARD AT ALL

## 2021-09-19 NOTE — PROGRESS NOTES
2021    TELEHEALTH EVALUATION -- Audio/Visual (During MLBGY-42 public health emergency) at her house in [facility] at [place] in [place]    HPI:    Nicole To (:  1955) has requested an audio/video evaluation for the following concern(s):    Patient notes her home blood pressures are normal.  We discussed that with her systolic hypertension that I would like her to maintain numbers. Patient is 2 weeks status post total knee replacement and is doing well. Patient is Covid vaccinated. REVIEW OF SYSTEMS    Constitutional:  Denies fever, chills, weight loss or weakness  Eyes:  no photophobia or discharge  ENT:  no sore throat or ear pain  Cardiovascular:  Denies chest pain, palpitations or swelling  Respiratory:  Denies cough or shortness of breath  GI:  no abdominal pain, nausea, vomiting, or diarrhea  Musculoskeletal:  no back pain  Skin:  No rashes  Neurologic:  no headache, focal weakness, or sensory changes  Endocrine:  no polyuria or polydipsia        Prior to Visit Medications    Medication Sig Taking? Authorizing Provider   felodipine (PLENDIL) 2.5 MG extended release tablet TAKE 1 TABLET BY MOUTH EVERY DAY Yes Dominik Ashford MD   famotidine (PEPCID) 20 MG tablet TAKE 1 TABLET BY MOUTH TWICE PER DAY Yes Dominik Ashford MD   loratadine (CLARITIN) 10 MG tablet Take 1 tablet by mouth daily Yes Dominik Ashford MD   bisoprolol-hydroCHLOROthiazide [facility]) 10-6.25 MG per tablet TAKE 1 TABLET BY MOUTH EVERY DAY Yes Dominik Ashford MD   lidocaine-prilocaine (EMLA) 2.5-2.5 % cream Apply generously to port site 1 hr prior to treatment. Use non absorbing cover.  Indications: Administration of Local Anesthesia Yes Historical Provider, MD   Cholecalciferol (VITAMIN D3) 2000 units CAPS Take by mouth daily Yes Historical Provider, MD   vitamin B-6 (PYRIDOXINE) 100 MG tablet Take 100 mg by mouth daily Yes Historical Provider, MD   multivitamin-iron-minerals-folic acid (CENTRUM) Act, 1135 waiver authority and the Coronavirus Preparedness and Response Supplemental Appropriations Act, this Virtual Visit was conducted with patient's (and/or legal guardian's) consent, to reduce the patient's risk of exposure to COVID-19 and provide necessary medical care. The patient (and/or legal guardian) has also been advised to contact this office for worsening conditions or problems, and seek emergency medical treatment and/or call 911 if deemed necessary. Patient identification was verified at the start of the visit: Yes    Total time spent on this encounter: Not billed by time    Services were provided through a video synchronous discussion virtually to substitute for in-person clinic visit. Patient and provider were located at their individual homes. --Melvin Fuentes MD on 9/19/2021 at 2:47 PM    An electronic signature was used to authenticate this note.

## 2021-11-28 DIAGNOSIS — I10 ESSENTIAL HYPERTENSION: ICD-10-CM

## 2021-11-29 RX ORDER — BISOPROLOL FUMARATE AND HYDROCHLOROTHIAZIDE 10; 6.25 MG/1; MG/1
TABLET ORAL
Qty: 90 TABLET | Refills: 0 | Status: SHIPPED | OUTPATIENT
Start: 2021-11-29 | End: 2022-02-28 | Stop reason: SDUPTHER

## 2022-01-25 ENCOUNTER — TELEPHONE (OUTPATIENT)
Dept: FAMILY MEDICINE CLINIC | Age: 67
End: 2022-01-25

## 2022-01-25 NOTE — TELEPHONE ENCOUNTER
----- Message from Ricardo Mejia sent at 1/25/2022  8:52 AM EST -----  Subject: Appointment Request    Reason for Call: Urgent Abdominal Pain    QUESTIONS  Type of Appointment? Established Patient  Reason for appointment request? Available appointments did not meet   patient need  Additional Information for Provider? Client called in she is having   problems with her gallbladder, she would only like to have a visit with   primary Care doctor, could someone please contact the client to see if you   can get her in asa , She has appt on feb 28th but needs to be seen sooner   ,  ---------------------------------------------------------------------------  --------------  [address]  What is the best way for the office to contact you? OK to leave message on   voicemail  Preferred Call Back Phone Number? [phone]  ---------------------------------------------------------------------------  --------------  SCRIPT ANSWERS  Relationship to Patient? Self  Do you have pain that has started or worsened within the past 24 hours? No  Are you vomiting blood or have bloody or black stool? No  Have you recently (14 days) seen a provider for this pain? No  Have you been diagnosed with, awaiting test results for, or told that you   are suspected of having COVID-19 (Coronavirus)? (If patient has tested   negative or was tested as a requirement for work, school, or travel and   not based on symptoms, answer no)? No  Within the past two weeks have you developed any of the following symptoms   (answer no if symptoms have been present longer than 2 weeks or began   more than 2 weeks ago)? Fever or Chills, Cough, Shortness of breath or   difficulty breathing, Loss of taste or smell, Sore throat, Nasal   congestion, Sneezing or runny nose, Fatigue or generalized body aches   (answer no if pain is specific to a body part e.g. back pain), Diarrhea,   Headache?  No  Have you had close contact with someone with COVID-19 in the last 14 days? No  (Service Expert  click yes below to proceed with [facility] As Usual   Scheduling)?  Yes

## 2022-02-28 ENCOUNTER — OFFICE VISIT (OUTPATIENT)
Dept: FAMILY MEDICINE CLINIC | Age: 67
End: 2022-02-28
Payer: MEDICARE

## 2022-02-28 VITALS
OXYGEN SATURATION: 100 % | HEIGHT: 68 IN | WEIGHT: 206 LBS | DIASTOLIC BLOOD PRESSURE: 82 MMHG | BODY MASS INDEX: 31.22 KG/M2 | SYSTOLIC BLOOD PRESSURE: 122 MMHG | HEART RATE: 60 BPM

## 2022-02-28 DIAGNOSIS — C54.1 ENDOMETRIAL CANCER (HCC): ICD-10-CM

## 2022-02-28 DIAGNOSIS — K21.9 GASTROESOPHAGEAL REFLUX DISEASE WITHOUT ESOPHAGITIS: ICD-10-CM

## 2022-02-28 DIAGNOSIS — H00.022 HORDEOLUM INTERNUM OF RIGHT LOWER EYELID: Primary | ICD-10-CM

## 2022-02-28 DIAGNOSIS — I10 ESSENTIAL HYPERTENSION: ICD-10-CM

## 2022-02-28 DIAGNOSIS — M89.8X1 PAIN OF RIGHT SCAPULA: ICD-10-CM

## 2022-02-28 PROCEDURE — 99214 OFFICE O/P EST MOD 30 MIN: CPT | Performed by: FAMILY MEDICINE

## 2022-02-28 RX ORDER — BISOPROLOL FUMARATE AND HYDROCHLOROTHIAZIDE 10; 6.25 MG/1; MG/1
TABLET ORAL
Qty: 90 TABLET | Refills: 1 | Status: SHIPPED | OUTPATIENT
Start: 2022-02-28 | End: 2022-10-12 | Stop reason: SDUPTHER

## 2022-02-28 RX ORDER — FAMOTIDINE 20 MG/1
TABLET, FILM COATED ORAL
Qty: 180 TABLET | Refills: 1 | Status: SHIPPED | OUTPATIENT
Start: 2022-02-28 | End: 2022-10-12 | Stop reason: SDUPTHER

## 2022-02-28 RX ORDER — FELODIPINE 2.5 MG/1
TABLET, EXTENDED RELEASE ORAL
Qty: 90 TABLET | Refills: 1 | Status: SHIPPED | OUTPATIENT
Start: 2022-02-28 | End: 2022-10-12 | Stop reason: SDUPTHER

## 2022-02-28 ASSESSMENT — PATIENT HEALTH QUESTIONNAIRE - PHQ9
2. FEELING DOWN, DEPRESSED OR HOPELESS: 0
SUM OF ALL RESPONSES TO PHQ QUESTIONS 1-9: 0
SUM OF ALL RESPONSES TO PHQ QUESTIONS 1-9: 0
SUM OF ALL RESPONSES TO PHQ9 QUESTIONS 1 & 2: 0
1. LITTLE INTEREST OR PLEASURE IN DOING THINGS: 0
SUM OF ALL RESPONSES TO PHQ QUESTIONS 1-9: 0
SUM OF ALL RESPONSES TO PHQ QUESTIONS 1-9: 0

## 2022-02-28 NOTE — PROGRESS NOTES
2/28/2022    Daniele Tineo    Chief Complaint   Patient presents with    6 Month Follow-Up    Other     possible \"stye\" right eye    Other     intermittent pain right rib post eating certain foods       HPI    Rhys Wheeler is a 77 y.o. female who presents today with follow-up. Patient looks very good. She is regained all her hair. Her CA-125 is risen 3 times in a row and she is getting it again recheck tomorrow. If elevated further this time they would do a PET scan. Patient notes a stye in her right eye. This has improved but not resolved. It has been going on for 1 to 2 weeks. Patient noted left scapular pain after eating. It is currently resolved. She is changed her diet and decreased fats. REVIEW OF SYSTEMS    Constitutional:  Denies fever, chills, weight loss or weakness  Eyes:  no photophobia or discharge  ENT:  no sore throat or ear pain  Cardiovascular:  Denies chest pain, palpitations or swelling  Respiratory:  Denies cough or shortness of breath  GI: See above  Musculoskeletal: See above  Skin:  No rashes  Neurologic:  no headache, focal weakness, or sensory changes  Endocrine:  no polyuria or polydipsia      PAST MEDICAL HISTORY  Past Medical History:   Diagnosis Date    Essential hypertension 6/28/2019    GERD (gastroesophageal reflux disease) 6/28/2019    History of malignant neoplasm of uterine body 6/28/2019    Hyperlipidemia 6/28/2019    Osteoarthritis of knee 6/28/2019    Steatosis of liver 6/28/2019       FAMILY HISTORY  No family history on file.     SOCIAL HISTORY  Social History     Socioeconomic History    Marital status:      Spouse name: None    Number of children: None    Years of education: None    Highest education level: None   Occupational History    None   Tobacco Use    Smoking status: Never Smoker    Smokeless tobacco: Never Used   Vaping Use    Vaping Use: Never used   Substance and Sexual Activity    Alcohol use: No    Drug use: No    Sexual activity: None   Other Topics Concern    None   Social History Narrative    None     Social Determinants of Health     Financial Resource Strain: Low Risk     Difficulty of Paying Living Expenses: Not hard at all   Food Insecurity: No Food Insecurity    Worried About Running Out of Food in the Last Year: Never true    Hever of Food in the Last Year: Never true   Transportation Needs:     Lack of Transportation (Medical): Not on file    Lack of Transportation (Non-Medical):  Not on file   Physical Activity:     Days of Exercise per Week: Not on file    Minutes of Exercise per Session: Not on file   Stress:     Feeling of Stress : Not on file   Social Connections:     Frequency of Communication with Friends and Family: Not on file    Frequency of Social Gatherings with Friends and Family: Not on file    Attends Taoist Services: Not on file    Active Member of Clubs or Organizations: Not on file    Attends Club or Organization Meetings: Not on file    Marital Status: Not on file   Intimate Partner Violence:     Fear of Current or Ex-Partner: Not on file    Emotionally Abused: Not on file    Physically Abused: Not on file    Sexually Abused: Not on file   Housing Stability:     Unable to Pay for Housing in the Last Year: Not on file    Number of [place] in the Last Year: Not on file    Unstable Housing in the Last Year: Not on file        SURGICAL HISTORY  Past Surgical History:   Procedure Laterality Date     SECTION      COLONOSCOPY      exams x2 - HX: polyps, unsure of dates (endometrial CA)    COLONOSCOPY  2017    Normal exam    HERNIA REPAIR  8444    umbillical    HERNIA REPAIR  9153    umbillical    HYSTERECTOMY  10/03/2016    d/t endometrial CA    TUNNELED VENOUS PORT PLACEMENT Right 10/2016       CURRENT MEDICATIONS  Current Outpatient Medications   Medication Sig Dispense Refill    bisoprolol-hydroCHLOROthiazide (ZIAC) 10-6.25 MG per tablet TAKE 1 TABLET BY MOUTH EVERY DAY 90 tablet 1    felodipine (PLENDIL) 2.5 MG extended release tablet TAKE 1 TABLET BY MOUTH EVERY DAY 90 tablet 1    famotidine (PEPCID) 20 MG tablet TAKE 1 TABLET BY MOUTH TWICE PER  tablet 1    lidocaine-prilocaine (EMLA) 2.5-2.5 % cream Apply generously to port site 1 hr prior to treatment. Use non absorbing cover. Indications: Administration of Local Anesthesia      Cholecalciferol (VITAMIN D3) 2000 units CAPS Take by mouth daily      vitamin B-6 (PYRIDOXINE) 100 MG tablet Take 100 mg by mouth daily      multivitamin-iron-minerals-folic acid (CENTRUM) chewable tablet Take 1 tablet by mouth daily. No current facility-administered medications for this visit. ALLERGIES  Allergies   Allergen Reactions    Erythromycin Other (See Comments)     GI    Lipitor [Atorvastatin Calcium]     Penicillins     Pravachol [Pravastatin Sodium]     Zocor [Simvastatin]     Doxycycline Rash       PHYSICAL EXAM  /82   Pulse 60   Ht 5' 8\" (1.727 m)   Wt 206 lb (93.4 kg)   SpO2 100%   BMI 31.32 kg/m²     ASSESSMENT & PLAN  0. Hordeolum internum of the right lower eyelid  Heating pad  Follow-up ophthalmology if not resolving    0.5. Pain of right scapula  Since pain is always postprandial and resolves in between very suspicious for biliary disease. Patient prefers controlling rather than surgery if possible. Patient to call if she wishes for surgical evaluation-would get a right upper quadrant ultrasound. 1. Essential hypertension  Issue controlled. Continue meds. Refilled meds. - bisoprolol-hydroCHLOROthiazide (ZIAC) 10-6.25 MG per tablet; TAKE 1 TABLET BY MOUTH EVERY DAY  Dispense: 90 tablet; Refill: 1  - felodipine (PLENDIL) 2.5 MG extended release tablet; TAKE 1 TABLET BY MOUTH EVERY DAY  Dispense: 90 tablet; Refill: 1    2. Gastroesophageal reflux disease without esophagitis  Issue controlled. Continue meds. Refilled meds.   - famotidine (PEPCID) 20 MG tablet; TAKE 1 TABLET BY MOUTH TWICE PER DAY  Dispense: 180 tablet; Refill: 1    3.  Endometrial cancer [facility])  Patient with appropriate follow-up  Continue the same    Follow-up in 6 months or earlier as needed       Electronically signed by Abdiel Cruz MD on 2/28/2022

## 2022-07-05 ENCOUNTER — TELEPHONE (OUTPATIENT)
Dept: FAMILY MEDICINE CLINIC | Age: 67
End: 2022-07-05

## 2022-07-06 ENCOUNTER — TELEMEDICINE (OUTPATIENT)
Dept: FAMILY MEDICINE CLINIC | Age: 67
End: 2022-07-06
Payer: MEDICARE

## 2022-07-06 DIAGNOSIS — Z00.00 INITIAL MEDICARE ANNUAL WELLNESS VISIT: Primary | ICD-10-CM

## 2022-07-06 PROCEDURE — 1123F ACP DISCUSS/DSCN MKR DOCD: CPT | Performed by: FAMILY MEDICINE

## 2022-07-06 PROCEDURE — G0438 PPPS, INITIAL VISIT: HCPCS | Performed by: FAMILY MEDICINE

## 2022-07-06 ASSESSMENT — PATIENT HEALTH QUESTIONNAIRE - PHQ9
SUM OF ALL RESPONSES TO PHQ QUESTIONS 1-9: 0
SUM OF ALL RESPONSES TO PHQ9 QUESTIONS 1 & 2: 0
1. LITTLE INTEREST OR PLEASURE IN DOING THINGS: 0
SUM OF ALL RESPONSES TO PHQ QUESTIONS 1-9: 0
2. FEELING DOWN, DEPRESSED OR HOPELESS: 0

## 2022-07-06 ASSESSMENT — LIFESTYLE VARIABLES: HOW OFTEN DO YOU HAVE A DRINK CONTAINING ALCOHOL: NEVER

## 2022-07-06 NOTE — PROGRESS NOTES
Medicare Annual Wellness Visit    Andree Thomas is here for Medicare AWV    Assessment & Plan   Initial Medicare annual wellness visit      Recommendations for Preventive Services Due: see orders and patient instructions/AVS.  Recommended screening schedule for the next 5-10 years is provided to the patient in written form: see Patient Instructions/AVS.     No follow-ups on file. Subjective       Patient's complete Health Risk Assessment and screening values have been reviewed and are found in Flowsheets. The following problems were reviewed today and where indicated follow up appointments were made and/or referrals ordered.     Positive Risk Factor Screenings with Interventions:             General Health and ACP:  General  In general, how would you say your health is?: Good  In the past 7 days, have you experienced any of the following: New or Increased Pain, New or Increased Fatigue, Loneliness, Social Isolation, Stress or Anger?: No  Do you get the social and emotional support that you need?: Yes  Do you have a Living Will?: Yes    Advance Directives     Power of  Living Will ACP-Advance Directive ACP-Power of     Not on File Not on File Not on File Not on File      General Health Risk Interventions:  · No Living Will: ACP documents already completed- patient asked to provide copy to the office    Health Habits/Nutrition:     Physical Activity: Insufficiently Active    Days of Exercise per Week: 2 days    Minutes of Exercise per Session: 20 min     Have you lost any weight without trying in the past 3 months?: No     Have you seen the dentist within the past year?: (!) No    Health Habits/Nutrition Interventions:  · Inadequate physical activity:  patient is not ready to increase his/her physical activity level at this time  · Nutritional issues:  patient is not ready to address his/her nutritional/weight issues at this time  · Dental exam overdue:  patient encouraged to make appointment with his/her dentist    Hearing/Vision:  Do you or your family notice any trouble with your hearing that hasn't been managed with hearing aids?: No  Do you have difficulty driving, watching TV, or doing any of your daily activities because of your eyesight?: No  Have you had an eye exam within the past year?: (!) No  No exam data present    Hearing/Vision Interventions:  · Vision concerns:  patient encouraged to make appointment with his/her eye specialist            Objective      Patient-Reported Vitals  No data recorded     Unable to obtain 3 vital signs due to patient not having equipment to take blood pressure/temperature. Allergies   Allergen Reactions    Erythromycin Other (See Comments)     GI    Lipitor [Atorvastatin Calcium]     Penicillins     Pravachol [Pravastatin Sodium]     Zocor [Simvastatin]     Doxycycline Rash     Prior to Visit Medications    Medication Sig Taking? Authorizing Provider   bisoprolol-hydroCHLOROthiazide (ZIAC) 10-6.25 MG per tablet TAKE 1 TABLET BY MOUTH EVERY DAY Yes Isidro Sloan MD   felodipine (PLENDIL) 2.5 MG extended release tablet TAKE 1 TABLET BY MOUTH EVERY DAY Yes Isidro Sloan MD   famotidine (PEPCID) 20 MG tablet TAKE 1 TABLET BY MOUTH TWICE PER DAY Yes Isidro Sloan MD   lidocaine-prilocaine (EMLA) 2.5-2.5 % cream Apply generously to port site 1 hr prior to treatment. Use non absorbing cover. Indications: Administration of Local Anesthesia Yes Historical Provider, MD   Cholecalciferol (VITAMIN D3) 2000 units CAPS Take by mouth daily Yes Historical Provider, MD   vitamin B-6 (PYRIDOXINE) 100 MG tablet Take 100 mg by mouth daily Yes Historical Provider, MD   multivitamin-iron-minerals-folic acid (CENTRUM) chewable tablet Take 1 tablet by mouth daily.  Yes Historical Provider, MD [place] (Including outside providers/suppliers regularly involved in providing care):   Patient Care Team:  Isidro Sloan MD as PCP - Danish Gomez MD as PCP - [facility] Empaneled Provider     Reviewed and updated this visit:  Tobacco  Allergies  Meds  Med Hx  Surg Hx  Soc Hx  Fam Hx             I, Ani Romero LPN, [birth year], performed the documented evaluation under the direct supervision of the attending physician. Raphael Pak, was evaluated through a synchronous (real-time) audio encounter. The patient (or guardian if applicable) is aware that this is a billable service, which includes applicable co-pays. This Virtual Visit was conducted with patient's (and/or legal guardian's) consent. The visit was conducted pursuant to the emergency declaration under the [facility], [address] [place] [address] authority and the [place] [employer] [place] General Act. Patient identification was verified, and a caregiver was present when appropriate. The patient was located at Home: [facility] [address] [facility] [address]. Provider was located at [address]): [address] [place] [address]. [place] [id number]. Total time spent for this encounter: Not billed by time    --Ani Romero LPN on 8/3/2217 at 15:45 AM    An electronic signature was used to authenticate this note. This encounter was performed under myArcelia MDs, direct supervision, 7/6/2022.

## 2022-07-06 NOTE — PATIENT INSTRUCTIONS
Personalized Preventive Plan for Samira Powell - 7/6/2022  Medicare offers a range of preventive health benefits. Some of the tests and screenings are paid in full while other may be subject to a deductible, co-insurance, and/or copay. Some of these benefits include a comprehensive review of your medical history including lifestyle, illnesses that may run in your family, and various assessments and screenings as appropriate. After reviewing your medical record and screening and assessments performed today your provider may have ordered immunizations, labs, imaging, and/or referrals for you. A list of these orders (if applicable) as well as your Preventive Care list are included within your After Visit Summary for your review. Other Preventive Recommendations:    · A preventive eye exam performed by an eye specialist is recommended every 1-2 years to screen for glaucoma; cataracts, macular degeneration, and other eye disorders. · A preventive dental visit is recommended every 6 months. · Try to get at least 150 minutes of exercise per week or 10,000 steps per day on a pedometer . · Order or download the FREE \"Exercise & Physical Activity: Your Everyday Guide\" from [employer]. Call 1-462.166.7498 or search [employer] online. · You need 5938-2060 mg of calcium and 0795-5205 IU of vitamin D per day. It is possible to meet your calcium requirement with diet alone, but a vitamin D supplement is usually necessary to meet this goal.  · When exposed to the sun, use a sunscreen that protects against both UVA and UVB radiation with an SPF of 30 or greater. Reapply every 2 to 3 hours or after sweating, drying off with a towel, or swimming. · Always wear a seat belt when traveling in a car. Always wear a helmet when riding a bicycle or motorcycle.

## 2022-10-11 DIAGNOSIS — K21.9 GASTROESOPHAGEAL REFLUX DISEASE WITHOUT ESOPHAGITIS: ICD-10-CM

## 2022-10-11 DIAGNOSIS — I10 ESSENTIAL HYPERTENSION: ICD-10-CM

## 2022-10-11 NOTE — TELEPHONE ENCOUNTER
----- Message from Sherwin Tarango sent at 10/11/2022  9:57 AM EDT -----  Subject: Refill Request    QUESTIONS  Name of Medication? bisoprolol-hydroCHLOROthiazide (ZIAC) 10-6.25 MG per   tablet  Patient-reported dosage and instructions? daily  How many days do you have left? 9  Preferred Pharmacy? [facility] #[id number]  Pharmacy phone number (if available)? [phone]  ---------------------------------------------------------------------------  --------------,  Name of Medication? felodipine (PLENDIL) 2.5 MG extended release tablet  Patient-reported dosage and instructions? daily  How many days do you have left? 10  Preferred Pharmacy? [facility] #[id number]  Pharmacy phone number (if available)? [phone]  ---------------------------------------------------------------------------  --------------,  Name of Medication? famotidine (PEPCID) 20 MG tablet  Patient-reported dosage and instructions? twice a day  How many days do you have left? 15  Preferred Pharmacy? [facility] #[id number]  Pharmacy phone number (if available)? [phone]  ---------------------------------------------------------------------------  --------------  CALL BACK INFO  What is the best way for the office to contact you? OK to leave message on   voicemail  Preferred Call Back Phone Number? [phone]  ---------------------------------------------------------------------------  --------------  SCRIPT ANSWERS  Relationship to Patient?  Self

## 2022-10-12 RX ORDER — FELODIPINE 2.5 MG/1
TABLET, EXTENDED RELEASE ORAL
Qty: 90 TABLET | Refills: 0 | Status: SHIPPED | OUTPATIENT
Start: 2022-10-12

## 2022-10-12 RX ORDER — BISOPROLOL FUMARATE AND HYDROCHLOROTHIAZIDE 10; 6.25 MG/1; MG/1
TABLET ORAL
Qty: 90 TABLET | Refills: 0 | Status: SHIPPED | OUTPATIENT
Start: 2022-10-12

## 2022-10-12 RX ORDER — FAMOTIDINE 20 MG/1
TABLET, FILM COATED ORAL
Qty: 180 TABLET | Refills: 0 | Status: SHIPPED | OUTPATIENT
Start: 2022-10-12 | End: 2023-03-24

## 2022-11-21 ENCOUNTER — TELEPHONE (OUTPATIENT)
Dept: FAMILY MEDICINE CLINIC | Age: 67
End: 2022-11-21

## 2022-12-05 LAB — MAMMOGRAPHY, EXTERNAL: NEGATIVE

## 2023-09-18 ENCOUNTER — OFFICE (OUTPATIENT)
Dept: URBAN - METROPOLITAN AREA CLINIC 18 | Facility: CLINIC | Age: 68
End: 2023-09-18

## 2023-09-18 VITALS
DIASTOLIC BLOOD PRESSURE: 76 MMHG | HEART RATE: 78 BPM | WEIGHT: 230 LBS | SYSTOLIC BLOOD PRESSURE: 130 MMHG | HEIGHT: 58 IN

## 2023-09-18 DIAGNOSIS — Z86.010 PERSONAL HISTORY OF COLONIC POLYPS: ICD-10-CM

## 2023-09-18 DIAGNOSIS — E66.3 OVERWEIGHT: ICD-10-CM

## 2023-09-18 DIAGNOSIS — Z80.0 FAMILY HISTORY OF MALIGNANT NEOPLASM OF DIGESTIVE ORGANS: ICD-10-CM

## 2023-09-18 PROCEDURE — 99203 OFFICE O/P NEW LOW 30 MIN: CPT | Performed by: INTERNAL MEDICINE

## 2024-02-08 LAB — MAMMOGRAPHY, EXTERNAL: NEGATIVE

## 2024-05-30 ENCOUNTER — COMMUNITY OUTREACH (OUTPATIENT)
Dept: FAMILY MEDICINE CLINIC | Age: 69
End: 2024-05-30

## 2024-05-30 NOTE — PROGRESS NOTES
Patient's HM shows they are overdue for Mammogram Screening.  Care Everywhere and  files searched.  Results attached to order and HM updated.